# Patient Record
Sex: MALE | Race: WHITE
[De-identification: names, ages, dates, MRNs, and addresses within clinical notes are randomized per-mention and may not be internally consistent; named-entity substitution may affect disease eponyms.]

---

## 2019-03-09 ENCOUNTER — HOSPITAL ENCOUNTER (EMERGENCY)
Dept: HOSPITAL 80 - FED | Age: 44
Discharge: HOME | End: 2019-03-09
Payer: COMMERCIAL

## 2019-03-09 VITALS — SYSTOLIC BLOOD PRESSURE: 137 MMHG | DIASTOLIC BLOOD PRESSURE: 89 MMHG

## 2019-03-09 DIAGNOSIS — M25.561: Primary | ICD-10-CM

## 2019-03-09 PROCEDURE — 0S9C3ZZ DRAINAGE OF RIGHT KNEE JOINT, PERCUTANEOUS APPROACH: ICD-10-PCS

## 2019-03-09 NOTE — EDPHY
H & P


Stated Complaint: R leg pain-migrating into back


Time Seen by Provider: 03/09/19 20:11


HPI/ROS: 





CHIEF COMPLAINT:  Right knee pain





HISTORY OF PRESENT ILLNESS:  43-year-old male, generally healthy, arrives via 

private vehicle complaining of atraumatic right knee pain.  He was playing 

tennis earlier this afternoon and notes no specific incident where he felt 

immediate pain or had any direct trauma to his knee.  Proximally hour later he 

started to notice progressive right medial knee pain as well as soft tissue 

swelling including into the popliteal aspect of the knee.  During episode of 

pain he felt lightheaded, cold and clammy as well as carpal pedal spasms.  No 

syncope.  States that these symptoms have by and large resolved now.  No  

complaints.





PRIMARY CARE PROVIDER:  





REVIEW OF SYSTEMS:


10 systems reviewed and negative with the exception of the elements mentioned 

in the history of present illness








PAST MEDICAL & SURGICAL  HISTORY:  Patient is Immunocompetent  





SOCIAL HISTORY: .  No IV drug use.  Works as an   














************


PHYSICAL EXAM





(Prior to examination, patient consented to physical exam, hands were washed 

and my usual and customary physical exam procedures followed)


1) GENERAL: Well-developed, well-nourished, alert and oriented.  Appears to be 

in no acute distress.


2) HEAD: Normocephalic, atraumatic


3) HEENT: Pupils equal, round, reactive to light bilaterally.  Sclera 

anicteric. 


4) NECK: Full range of motion, no meningeal signs.


5) LUNGS: Clear auscultation bilaterally, no wheezes, no rhonchi, no 

retractions.   


6) HEART: Regular rate and rhythm, no murmur, no heave, no gallop.


7) ABDOMEN: No guarding, no rebound, no focal tenderness, negative McBurney's, 

negative Friedman's, negative Rovsing's, negative peritoneal sign,


8) MUSCULOSKELETAL:  Right lower extremity:  Prepatellar effusion is noted, 

tender to palpation medial and popliteal aspect of the knee with limited range 

of motion secondary to pain.  Able to extend to approximately 70 degrees with 

limited flexion.  Soft compartments throughout.  DP PT pulses present and 

brisk.  Normal coloration and temperature to the foot and distally to the knee.

  Normal coloration.  No pain with axial loading of the joint.  Proximally 

distally nontender with soft compartments.


9) BACK: No CVA tenderness, no midline vertebral tenderness, no fluctuance, no 

step-off, no obvious trauma, no visual or palpable abnormality. 


10) SKIN: No rash, no petechiae. 


11) Psychiatric:  Patient is oriented X 3, there is no agitation.








***************





DIFFERENTIAL DIAGNOSIS:  In no particular order including but not limited to 

fracture, sprain, strain, septic arthritis, crystal arthritis, DVT, arterial 

occlusion, 








- Personal History


Current Tetanus Diphtheria and Acellular Pertussis (TDAP): Yes





- Medical/Surgical History


Hx Asthma: No


Hx Chronic Respiratory Disease: No


Hx Diabetes: No


Hx Cardiac Disease: No


Hx Renal Disease: No


Hx Cirrhosis: No


Hx Alcoholism: No


Hx HIV/AIDS: No


Hx Splenectomy or Spleen Trauma: No


Other PMH: R shoulder





- Social History


Smoking Status: Never smoked


Constitutional: 


 Initial Vital Signs











Temperature (C)  36.7 C   03/09/19 19:50


 


Heart Rate  74   03/09/19 19:50


 


Respiratory Rate  16   03/09/19 19:50


 


Blood Pressure  119/90 H  03/09/19 19:50


 


O2 Sat (%)  96   03/09/19 19:50








 











O2 Delivery Mode               Room Air














Allergies/Adverse Reactions: 


 





No Known Allergies Allergy (Unverified 03/09/19 19:50)


 








Home Medications: 














 Medication  Instructions  Recorded


 


Ambien  03/09/19


 


Hydrocodone/Acetaminophen [Vicodin 1 each PO Q6 #10 tablet 03/09/19





5-300 mg Tablet]  


 


Lexapro  03/09/19


 


Xanax  03/09/19














Medical Decision Making





- Diagnostics


Imaging Results: 


 Imaging Impressions





Extremity Venous Study  03/09/19 20:17


Impression: No evidence of deep vein thrombosis in the right lower extremity.


 


Results called and discussed with THAD BECKER on 3/9/2019 at 21:35.


 








Knee X-Ray  03/09/19 20:17


Impression: Negative for fracture. If symptoms persist, MRI could be considered 

for further evaluation.


 


 








Images reviewed myself


Procedures: 





Procedure: Arthrocentesis.


Indication: Evaluation for the possibility of septic joint.  Risks, benefits, 

alternatives of the procedure were discussed with the patient and consent 

obtained.  


The patient was prepped and draped in the usual sterile fashion over the right 

medial knee joint.  Local anesthesia was provided with 1% lidocaine with 

epinephrine.  The joint space was entered with a 18  gauge needle and 2 cc of 

bloody straw-colored fluid was obtained.  There were no complications.


The procedure was performed by myself.  Patient tolerated procedure well.  

Synovial fluid sent to laboratory for evaluation





Procedure:  Crutches


 indications for crutch use discussed with patient.  Patient fitted for 

crutches by ER staff.  Observed ambulating with crutches.  I think the patient 

has the capacity to safely use crutches.  Usual and customary crutch walking 

precautions provided





Procedure:  Splint 





A knee immobilizer splint was applied by ER technician.   After application of 

the splint I returned and re-examined the patient.  The splint was adequately 

immobilizing the joint and distal to the splint the patient's circulation and 

sensation were intact.  Patient shows no signs of compartment syndrome.  Was 

given orthopedic precautions.


ED Course/Re-evaluation: 











9:46 p.m.:  Patient will be contacted regarding results of his synovial fluid 

analysis.  He would like to know whether he can be discharged.  I am agreeable 

with this, I will contact him this later on this evening with the results of 

the synovial fluid analysis.  Is no evidence of DVT.  Doubt arterial occlusion.

  We discussed acute inflammatory pathology which may be the more than likely 

pathology given his playing tennis as preceding his symptoms.  No history of 

trauma.  Will plan on discharging the patient with knee immobilizer, crutches, 

follow up with orthopedic referral and analgesia.














11:16 p.m.:  I spoke with the patient at this time to update him with the 

initial Gram stain which is negative for organisms [place] 

[place] [id number]. I think his symptoms less than likely represents septic arthritis.  

He will plan on following up with Orthopedics, will call their office on Monday 

(today is Saturday).  





- Data Points


Laboratory Results: 


 











  03/09/19





  22:08


 


Synovial Source  Pending 





  


 


Synovial Color  Pending 





  


 


Synovial Appearance  Pending 





  


 


Synovial WBC  14080 /mm3 H /mm3





   (0-150) 


 


Synovial RBC  41898 /mm3 H /mm3





   (0-0) 











Microbiology Results: 


 MICROBIOLOGY





03/09/19 22:08   Synovial Fluid - Aspirate   Gram Stain - Final








Departure





- Departure


Disposition: Home, Routine, Self-Care


Clinical Impression: 


 Right medial knee pain





Condition: Good


Instructions:  Knee Pain (ED)


Additional Instructions: 


___________________________________________________





Return to the ER immediately if you experience discoloration, have worsening 

pain, numbness, tingling, or any other symptoms that concern you.  If you 

received x-rays in the emergency department today, be advised, that ligamentous

, tendon, muscular, and other non-bony injury cannot be fully ruled out. Try to 

keep your affected extremity elevated above the level of your chest, and keep 

cold packs on the affected area, for the next 48 hours.





__________________________________________________


Referrals: 


Micah Loving MD [Medical Doctor] - 2-3 days, call for appt.


Prescriptions: 


Hydrocodone/Acetaminophen [Vicodin 5-300 mg Tablet] 1 each PO Q6 #10 tablet